# Patient Record
(demographics unavailable — no encounter records)

---

## 2024-12-11 NOTE — PHYSICAL EXAM
[No Acute Distress] : no acute distress [Well Developed] : well developed [Well-Appearing] : well-appearing [Normal] : no acute distress, well nourished, well developed and well-appearing [Clear to Auscultation] : lungs were clear to auscultation bilaterally [Normal Rate] : normal rate  [Normal S1, S2] : normal S1 and S2 [Soft] : abdomen soft [Non Tender] : non-tender [de-identified] : 2cm lacerarion to the palmar aspect of the left thumb distal phalanx, slight bleeding

## 2024-12-11 NOTE — HISTORY OF PRESENT ILLNESS
[FreeTextEntry8] : 45 y o w presents with left thumb laceration she sustained last night rom a broken drinking glass.

## 2024-12-11 NOTE — PHYSICAL EXAM
[No Acute Distress] : no acute distress [Well Developed] : well developed [Well-Appearing] : well-appearing [Normal] : no acute distress, well nourished, well developed and well-appearing [Clear to Auscultation] : lungs were clear to auscultation bilaterally [Normal Rate] : normal rate  [Normal S1, S2] : normal S1 and S2 [Soft] : abdomen soft [Non Tender] : non-tender [de-identified] : 2cm lacerarion to the palmar aspect of the left thumb distal phalanx, slight bleeding

## 2024-12-11 NOTE — PLAN
[FreeTextEntry1] : 45 y o w with a 2 cm laceration of the left thumb. Bleeding controlled, 2 stiches placed under local anesthesia (digital block) without complication. Steri-strips placed over sutures, Bacitracin applied and covered with gauze. Tdap up to date as per pt, this current year. Keep wound dry and clean. RTC 10 days for suture removal or sooner if any complications.